# Patient Record
Sex: MALE | Race: WHITE | NOT HISPANIC OR LATINO | Employment: FULL TIME | URBAN - METROPOLITAN AREA
[De-identification: names, ages, dates, MRNs, and addresses within clinical notes are randomized per-mention and may not be internally consistent; named-entity substitution may affect disease eponyms.]

---

## 2019-03-13 ENCOUNTER — OFFICE VISIT (OUTPATIENT)
Dept: URGENT CARE | Facility: CLINIC | Age: 39
End: 2019-03-13
Payer: COMMERCIAL

## 2019-03-13 VITALS
DIASTOLIC BLOOD PRESSURE: 84 MMHG | RESPIRATION RATE: 20 BRPM | SYSTOLIC BLOOD PRESSURE: 120 MMHG | HEART RATE: 92 BPM | OXYGEN SATURATION: 99 % | TEMPERATURE: 98 F | WEIGHT: 183 LBS

## 2019-03-13 DIAGNOSIS — R20.2 PARESTHESIA: Primary | ICD-10-CM

## 2019-03-13 PROCEDURE — 99214 OFFICE O/P EST MOD 30 MIN: CPT | Mod: S$GLB,,, | Performed by: PHYSICIAN ASSISTANT

## 2019-03-13 PROCEDURE — 99999 PR PBB SHADOW E&M-NEW PATIENT-LVL IV: CPT | Mod: PBBFAC,,, | Performed by: PHYSICIAN ASSISTANT

## 2019-03-13 PROCEDURE — 99214 PR OFFICE/OUTPT VISIT, EST, LEVL IV, 30-39 MIN: ICD-10-PCS | Mod: S$GLB,,, | Performed by: PHYSICIAN ASSISTANT

## 2019-03-13 PROCEDURE — 99999 PR PBB SHADOW E&M-NEW PATIENT-LVL IV: ICD-10-PCS | Mod: PBBFAC,,, | Performed by: PHYSICIAN ASSISTANT

## 2019-03-13 RX ORDER — HYDROCODONE BITARTRATE AND ACETAMINOPHEN 5; 325 MG/1; MG/1
1 TABLET ORAL
COMMUNITY
End: 2019-03-18

## 2019-03-13 NOTE — LETTER
March 13, 2019      [facility]  [address] [place] [address]  Phone: [phone]  Fax: [phone]       Patient: Raoul Pavon   YOB: 1980  Date of Visit: 03/13/2019    To Whom It May Concern:    Marguerite Pavon  was at [facility] on 03/13/2019. He may return to work on 3/18/2019 with no restrictions. If you have any questions or concerns, or if I can be of further assistance, please do not hesitate to contact me.    Sincerely,    Keila León PA-C

## 2019-03-13 NOTE — PROGRESS NOTES
Subjective:      Patient ID: Raoul Pavon is a 38 y.o. male.    Chief Complaint: Tingling    Raoul is a 38 year male who presents to urgent care with intermittent numbness/tingling/pressure around his left eye/upper left face for the past month.  Denies visual disturbances, facial droop, facial or extremity weakness, chest pain, palpitations.  The pain is worse with brushing his teeth.  Rubbing his face will make it go away for a few seconds.        Review of Systems   Constitutional: Negative for chills and fever.   HENT: Negative for ear discharge, ear pain, facial swelling, sinus pressure and sinus pain.    Eyes: Negative for pain, redness and visual disturbance.   Respiratory: Negative for chest tightness, shortness of breath and wheezing.    Cardiovascular: Negative for chest pain and palpitations.   Gastrointestinal: Negative for abdominal pain, diarrhea, nausea and vomiting.   Skin: Negative for rash.   Neurological: Positive for numbness (left upper face intermittent numb/tingling ). Negative for dizziness, tremors, seizures, syncope, facial asymmetry, speech difficulty, weakness, light-headedness and headaches.       Objective:   /84   Pulse 92   Temp 98.2 °F (36.8 °C)   Resp 20   Wt 83 kg (182 lb 15.7 oz)   SpO2 99%   Physical Exam   Constitutional: He is oriented to person, place, and time. He appears well-developed and well-nourished. He is active.  Non-toxic appearance. He does not have a sickly appearance. He does not appear ill. No distress.   HENT:   Head: Normocephalic.   Right Ear: Hearing, tympanic membrane, external ear and ear canal normal. No drainage, swelling or tenderness. Tympanic membrane is not perforated, not erythematous and not bulging. No middle ear effusion. No decreased hearing is noted.   Left Ear: Hearing, tympanic membrane, external ear and ear canal normal. No drainage, swelling or tenderness. Tympanic membrane is not perforated, not erythematous and not  bulging.  No middle ear effusion. No decreased hearing is noted.   Nose: Nose normal. No mucosal edema or rhinorrhea. Right sinus exhibits no maxillary sinus tenderness and no frontal sinus tenderness. Left sinus exhibits no maxillary sinus tenderness and no frontal sinus tenderness.   Mouth/Throat: Uvula is midline, oropharynx is clear and moist and mucous membranes are normal.   Eyes: Conjunctivae and EOM are normal. Pupils are equal, round, and reactive to light. Right eye exhibits normal extraocular motion and no nystagmus. Left eye exhibits normal extraocular motion and no nystagmus.   Neck: Normal range of motion and full passive range of motion without pain. Neck supple.   Cardiovascular: Normal rate, regular rhythm and normal heart sounds.   Pulses:       Radial pulses are 2+ on the right side, and 2+ on the left side.   Pulmonary/Chest: Effort normal and breath sounds normal.   Musculoskeletal:        Right hand: Normal strength noted.        Left hand: Normal strength noted.   JOANN UE and LE strength 5/5   Neurological: He is alert and oriented to person, place, and time. He has normal strength. He is not disoriented. No cranial nerve deficit or sensory deficit. Coordination and gait normal.   Skin: Skin is warm and dry. He is not diaphoretic.   Vitals reviewed.    Assessment:      1. Paresthesia       Plan:   Paresthesia    Paresthesia    -  Intermittent tingling/numbness/pressure left upper face - etiology unclear    -  Raoul denies slurred speech, facial weakness or droop, visual changes, extremity numbness or weakness - reviewed symptoms of stroke and advised Raoul to go to ED if develop any stroke symptoms    -  Needs further eval/workup - recommended further eval with primary care - has outside primary care physician (Dr. Madden) - Raoul would like to establish a primary care physician with [facility] - appt made for next week     AVS provided and instructions reviewed with patient. Patient was  counseled on supportive care and instructed to return or contact primary care provider if condition does not improve or for any new or worsening symptoms.    Keila León PA-C   Physician Assistant   [facility], [place]

## 2019-03-13 NOTE — PATIENT INSTRUCTIONS
"  Paraesthesias  Paraesthesia is a burning or prickling sensation that is sometimes felt in the hands, arms, legs or feet. It can also occur in other parts of the body. It can also feel like tingling or numbness, skin crawling, or itching. The feeling is not comfortable, but it is not painful. (The "pins and needles" feeling that happens when a foot or hand "falls asleep" is a temporary paraesthesia.)  Paraesthesias that last or come and go may be caused by medical issues that need to be treated. These include stroke, a bulging disk pressing on a nerve, a trapped nerve, vitamin deficiencies, or even certain medicines.  Tests are often done. These tests may include blood tests, X-ray, CT (computerized tomography) scan, or a muscle test (electromyography). Depending on the cause, treatment may include physical therapy.  Home care  · Tell the healthcare provider about all medicines you take. This includes prescription and over-the-counter medicines, vitamins, and herbs. Ask if any of the medicines may be causing your problems. Do not make any changes to prescription medicines without talking to your healthcare provider first.  · You may be prescribed medicines to help relieve the tingling feeling or for pain. Take all medicines as directed.  · A numb hand or foot may be more prone to injury. To help protect it:  ¨ Always use oven mitts.  ¨ Test water with an unaffected hand or foot.  ¨ Use caution when trimming nails. File sharp areas.  ¨ Wear shoes that fit well to avoid pressure points, blisters, and ulcers.  ¨ Inspect your hands and feet carefully (including the soles of your feet and between your toes) at least once a week. If you see red areas, sores, or other problems, tell your healthcare provider.  Follow-up care  Follow up with your doctor or as advised by our staff. You may need further testing or evaluation.  When to seek medical advice  Call your healthcare provider right away if any of the following " occur:  · Numbness or weakness of the face, one arm, or one leg  · Slurred speech, confusion, trouble speaking, walking, or seeing  · Severe headache, fainting spell, dizziness, or seizure  · Chest, arm, neck, or upper back pain  · Loss of bladder or bowel control  · Open wound with redness, swelling, or pus  Date Last Reviewed: 9/25/2015  © 1169-5163 [employer]. [address] [place] [address], [place] [address]. All rights reserved. This information is not intended as a substitute for professional medical care. Always follow your healthcare professional's instructions.

## 2019-03-14 ENCOUNTER — TELEPHONE (OUTPATIENT)
Dept: INTERNAL MEDICINE | Facility: CLINIC | Age: 39
End: 2019-03-14

## 2019-03-14 NOTE — TELEPHONE ENCOUNTER
----- Message from Sammie Roach sent at 3/14/2019  7:41 AM CDT -----  patient would like scan ordered, still having tingling issue..[phone] (home)

## 2019-03-14 NOTE — TELEPHONE ENCOUNTER
Consulted with Dr. Dodge due to Keila León being out of clinic.     Spoke with Patient and he stated that his symptoms have not worsened or improved. They are still the same.   Pt wanted to know if we would order him a scan. Informed pt we cannot order scans in urgent care but according to the providers note he is to follow up with PCP next week. If his symptoms worsen or if he develops any slurred speech, facial weakness, drooping, visual changes, extremities numbness, or weakness to go to the ER Immediately. Pt stated understanding.

## 2019-03-18 ENCOUNTER — HOSPITAL ENCOUNTER (EMERGENCY)
Facility: HOSPITAL | Age: 39
Discharge: HOME | End: 2019-03-18
Attending: EMERGENCY MEDICINE
Payer: COMMERCIAL

## 2019-03-18 VITALS
WEIGHT: 185.31 LBS | OXYGEN SATURATION: 99 % | BODY MASS INDEX: 29.09 KG/M2 | RESPIRATION RATE: 18 BRPM | DIASTOLIC BLOOD PRESSURE: 75 MMHG | HEIGHT: 67 IN | TEMPERATURE: 98 F | SYSTOLIC BLOOD PRESSURE: 116 MMHG | HEART RATE: 60 BPM

## 2019-03-18 DIAGNOSIS — R20.0 NUMBNESS: ICD-10-CM

## 2019-03-18 LAB
ALBUMIN SERPL BCP-MCNC: 4.5 G/DL
ALP SERPL-CCNC: 48 U/L
ALT SERPL W/O P-5'-P-CCNC: 18 U/L
ANION GAP SERPL CALC-SCNC: 8 MMOL/L
AST SERPL-CCNC: 18 U/L
BASOPHILS # BLD AUTO: 0.06 K/UL
BASOPHILS NFR BLD: 0.7 %
BILIRUB SERPL-MCNC: 0.5 MG/DL
BUN SERPL-MCNC: 10 MG/DL
CALCIUM SERPL-MCNC: 9.8 MG/DL
CHLORIDE SERPL-SCNC: 107 MMOL/L
CO2 SERPL-SCNC: 26 MMOL/L
CREAT SERPL-MCNC: 1 MG/DL
DIFFERENTIAL METHOD: ABNORMAL
EOSINOPHIL # BLD AUTO: 0.3 K/UL
EOSINOPHIL NFR BLD: 3.9 %
ERYTHROCYTE [DISTWIDTH] IN BLOOD BY AUTOMATED COUNT: 13 %
EST. GFR  (AFRICAN AMERICAN): >60 ML/MIN/1.73 M^2
EST. GFR  (NON AFRICAN AMERICAN): >60 ML/MIN/1.73 M^2
GLUCOSE SERPL-MCNC: 88 MG/DL
HCT VFR BLD AUTO: 49.9 %
HGB BLD-MCNC: 17.1 G/DL
INR PPP: 0.9
LYMPHOCYTES # BLD AUTO: 2.3 K/UL
LYMPHOCYTES NFR BLD: 26.5 %
MCH RBC QN AUTO: 31.1 PG
MCHC RBC AUTO-ENTMCNC: 34.3 G/DL
MCV RBC AUTO: 91 FL
MONOCYTES # BLD AUTO: 0.3 K/UL
MONOCYTES NFR BLD: 3.9 %
NEUTROPHILS # BLD AUTO: 5.6 K/UL
NEUTROPHILS NFR BLD: 65 %
PLATELET # BLD AUTO: 228 K/UL
PMV BLD AUTO: 10.4 FL
POTASSIUM SERPL-SCNC: 4.6 MMOL/L
PROT SERPL-MCNC: 7.9 G/DL
PROTHROMBIN TIME: 10.3 SEC
RBC # BLD AUTO: 5.49 M/UL
SODIUM SERPL-SCNC: 141 MMOL/L
WBC # BLD AUTO: 8.68 K/UL

## 2019-03-18 PROCEDURE — 85610 PROTHROMBIN TIME: CPT

## 2019-03-18 PROCEDURE — 93005 ELECTROCARDIOGRAM TRACING: CPT

## 2019-03-18 PROCEDURE — 93010 EKG 12-LEAD: ICD-10-PCS | Mod: ,,, | Performed by: INTERNAL MEDICINE

## 2019-03-18 PROCEDURE — 93010 ELECTROCARDIOGRAM REPORT: CPT | Mod: ,,, | Performed by: INTERNAL MEDICINE

## 2019-03-18 PROCEDURE — 80053 COMPREHEN METABOLIC PANEL: CPT

## 2019-03-18 PROCEDURE — 99285 EMERGENCY DEPT VISIT HI MDM: CPT | Mod: 25

## 2019-03-18 PROCEDURE — 85025 COMPLETE CBC W/AUTO DIFF WBC: CPT

## 2019-03-18 RX ORDER — PREDNISONE 50 MG/1
50 TABLET ORAL DAILY
Qty: 5 TABLET | Refills: 0 | Status: SHIPPED | OUTPATIENT
Start: 2019-03-18 | End: 2019-03-23

## 2019-03-18 NOTE — ED PROVIDER NOTES
Encounter Date: 3/18/2019       History     Chief Complaint   Patient presents with    Facial Pain     c/o facial pain/numbness for the last month      38 year old male with complaint of numbness and tingling from left side of forehead to left maxilla region X one month.  No facial weakness. No pain.  No fever or chills.  No change in vision.  Pt evaluated at urgent care last week with no diagnosis.  Denies neck pain.  Denies facial pain.            Review of patient's allergies indicates:  No Known Allergies  History reviewed. No pertinent past medical history.  Past Surgical History:   Procedure Laterality Date    HERNIA REPAIR       History reviewed. No pertinent family history.  Social History     Tobacco Use    Smoking status: Current Every Day Smoker     Packs/day: 1.00   Substance Use Topics    Alcohol use: Yes     Frequency: Never    Drug use: No     Review of Systems   Constitutional: Negative for fever.   HENT: Negative for sore throat.    Respiratory: Negative for shortness of breath.    Cardiovascular: Negative for chest pain.   Gastrointestinal: Negative for nausea.   Genitourinary: Negative for dysuria.   Musculoskeletal: Negative for back pain.   Skin: Negative for rash.   Neurological: Positive for numbness. Negative for weakness.   Hematological: Does not bruise/bleed easily.       Physical Exam     Initial Vitals [03/18/19 0905]   BP Pulse Resp Temp SpO2   125/79 83 18 98 °F (36.7 °C) 100 %      MAP       --         Physical Exam    Nursing note and vitals reviewed.  Constitutional: He appears well-developed and well-nourished.   HENT:   Head: Normocephalic and atraumatic.   Eyes: Conjunctivae are normal. Pupils are equal, round, and reactive to light.   Neck: Normal range of motion. Neck supple.   Cardiovascular: Normal rate, regular rhythm, normal heart sounds and intact distal pulses.   Pulmonary/Chest: Breath sounds normal.   Abdominal: Soft. There is no rebound and no guarding.    Musculoskeletal: Normal range of motion.   Neurological: He is alert and oriented to person, place, and time. He has normal strength and normal reflexes. He displays normal reflexes. No cranial nerve deficit or sensory deficit.   Skin: Skin is warm and dry.   Psychiatric: He has a normal mood and affect. His behavior is normal. Thought content normal.         ED Course   Procedures  Labs Reviewed   CBC W/ AUTO DIFFERENTIAL - Abnormal; Notable for the following components:       Result Value    MCH 31.1 (*)     Mono% 3.9 (*)     All other components within normal limits   COMPREHENSIVE METABOLIC PANEL - Abnormal; Notable for the following components:    Alkaline Phosphatase 48 (*)     All other components within normal limits   PROTIME-INR     EKG Readings: (Independently Interpreted)   Other EKG Interpretations: EKG: NSR with rate of 67, no st or t wave abnormalities       Imaging Results          MRI Brain Without Contrast (Final result)  Result time 03/18/19 14:15:07    Final result by Lucian Gallego Jr., MD (03/18/19 14:15:07)                 Impression:      Findings are most concerning for demyelinating disease and multiple sclerosis.  Postcontrast MRI imaging may help to evaluate for active demyelination.      Electronically signed by: Lucian Gallego Jr., MD  Date:    03/18/2019  Time:    14:15             Narrative:    EXAMINATION:  MRI BRAIN WITHOUT CONTRAST    CLINICAL HISTORY:  Neuro deficit(s), subacute; trigeminal neuralgia/numbness.    TECHNIQUE:  Sagittal T1. Axial T1, T2, T2 FLAIR, DWI. Coronal T2 FLAIR    COMPARISON:  None    FINDINGS:  There is no restricted diffusion. There are multiple elliptical shaped foci of T2 FLAIR hyperintensity that radiate outward from the bodies of the lateral ventricles.  There are also foci of T2 FLAIR hyperintensity involving the subcortical white matter and subcortical U fibers within the left frontal, right frontal, and right parietal lobes.  Elliptical shaped focus  of T2 FLAIR hyperintensity within the left thalamus and left lentiform nucleus.  Rounded focus within the left brachium pontis.    The ventricles and sulci are normal in size and configuration. Midline structures are normal. There are preserved arterial flow-voids on T2 weighted imaging. The paranasal sinuses and mastoid air cells are clear.    No abnormal marrow signal is identified.                               CT Head Without Contrast (Final result)  Result time 03/18/19 10:59:16    Final result by Ben Gage Jr., MD (03/18/19 10:59:16)                 Impression:      No acute intracranial findings evident.    All CT scans at this facility are performed  using dose modulation techniques as appropriate to performed exam including the following:  automated exposure control; adjustment of mA and/or kV according to the patients size (this includes techniques or standardized protocols for targeted exams where dose is matched to indication/reason for exam: i.e. extremities or head);  iterative reconstruction technique.      Electronically signed by: Ben Gage MD  Date:    03/18/2019  Time:    10:59             Narrative:    EXAMINATION:  CT HEAD WITHOUT CONTRAST    CLINICAL HISTORY:  Dizziness;    TECHNIQUE:  Noncontrast axial images of the head were obtained.    COMPARISON:  No comparison studies are available.    FINDINGS:  Vague 1 cm zone of hypodensity in the left thalamus, could correlate with a subacute ischemic infarct, image 12 series 2.    Ventricular system is normal.  No hydrocephalus.  No midline shift.  No abnormal density to indicate acute major vascular distribution ischemic infarction or hemorrhage.  No mass effect.  No extra-axial fluid collections.    Visualized paranasal sinuses and mastoid air cells appear clear.    No calvarial fracture.                                  Labs Reviewed   CBC W/ AUTO DIFFERENTIAL - Abnormal; Notable for the following components:       Result Value     MCH 31.1 (*)     Mono% 3.9 (*)     All other components within normal limits   COMPREHENSIVE METABOLIC PANEL - Abnormal; Notable for the following components:    Alkaline Phosphatase 48 (*)     All other components within normal limits   PROTIME-INR       Medical Decision Makin:35 PM  Discussed results with pt, pt has no motor deficits, pt reports that he has a neurologist that he will follow up with for further evaluation and treatment     Imaging Results          MRI Brain Without Contrast (Final result)  Result time 19 14:15:07    Final result by Lucian Gallego Jr., MD (19 14:15:07)                 Impression:      Findings are most concerning for demyelinating disease and multiple sclerosis.  Postcontrast MRI imaging may help to evaluate for active demyelination.      Electronically signed by: Lucian Gallego Jr., MD  Date:    2019  Time:    14:15             Narrative:    EXAMINATION:  MRI BRAIN WITHOUT CONTRAST    CLINICAL HISTORY:  Neuro deficit(s), subacute; trigeminal neuralgia/numbness.    TECHNIQUE:  Sagittal T1. Axial T1, T2, T2 FLAIR, DWI. Coronal T2 FLAIR    COMPARISON:  None    FINDINGS:  There is no restricted diffusion. There are multiple elliptical shaped foci of T2 FLAIR hyperintensity that radiate outward from the bodies of the lateral ventricles.  There are also foci of T2 FLAIR hyperintensity involving the subcortical white matter and subcortical U fibers within the left frontal, right frontal, and right parietal lobes.  Elliptical shaped focus of T2 FLAIR hyperintensity within the left thalamus and left lentiform nucleus.  Rounded focus within the left brachium pontis.    The ventricles and sulci are normal in size and configuration. Midline structures are normal. There are preserved arterial flow-voids on T2 weighted imaging. The paranasal sinuses and mastoid air cells are clear.    No abnormal marrow signal is identified.                               CT Head Without  Contrast (Final result)  Result time 03/18/19 10:59:16    Final result by Ben Gage Jr., MD (03/18/19 10:59:16)                 Impression:      No acute intracranial findings evident.    All CT scans at this facility are performed  using dose modulation techniques as appropriate to performed exam including the following:  automated exposure control; adjustment of mA and/or kV according to the patients size (this includes techniques or standardized protocols for targeted exams where dose is matched to indication/reason for exam: i.e. extremities or head);  iterative reconstruction technique.      Electronically signed by: Ben Gage MD  Date:    03/18/2019  Time:    10:59             Narrative:    EXAMINATION:  CT HEAD WITHOUT CONTRAST    CLINICAL HISTORY:  Dizziness;    TECHNIQUE:  Noncontrast axial images of the head were obtained.    COMPARISON:  No comparison studies are available.    FINDINGS:  Vague 1 cm zone of hypodensity in the left thalamus, could correlate with a subacute ischemic infarct, image 12 series 2.    Ventricular system is normal.  No hydrocephalus.  No midline shift.  No abnormal density to indicate acute major vascular distribution ischemic infarction or hemorrhage.  No mass effect.  No extra-axial fluid collections.    Visualized paranasal sinuses and mastoid air cells appear clear.    No calvarial fracture.                                                  Clinical Impression:       ICD-10-CM ICD-9-CM   1. Numbness R20.0 782.0                                Nayan Madrid NP  03/18/19 1437       Nayan Madrid NP  03/18/19 1437

## 2021-11-20 ENCOUNTER — HOSPITAL ENCOUNTER (OUTPATIENT)
Dept: RADIOLOGY | Facility: CLINIC | Age: 41
Discharge: HOME | End: 2021-11-20
Attending: EMERGENCY MEDICINE
Payer: OTHER MISCELLANEOUS

## 2021-11-20 ENCOUNTER — OFFICE VISIT (OUTPATIENT)
Dept: URGENT CARE | Facility: CLINIC | Age: 41
End: 2021-11-20
Payer: OTHER MISCELLANEOUS

## 2021-11-20 VITALS
DIASTOLIC BLOOD PRESSURE: 62 MMHG | BODY MASS INDEX: 32.96 KG/M2 | HEIGHT: 67 IN | SYSTOLIC BLOOD PRESSURE: 148 MMHG | WEIGHT: 210 LBS | TEMPERATURE: 98 F | OXYGEN SATURATION: 98 % | HEART RATE: 84 BPM | RESPIRATION RATE: 18 BRPM

## 2021-11-20 DIAGNOSIS — S99.922A INJURY OF LEFT FOOT, INITIAL ENCOUNTER: Primary | ICD-10-CM

## 2021-11-20 DIAGNOSIS — S99.922A INJURY OF LEFT FOOT, INITIAL ENCOUNTER: ICD-10-CM

## 2021-11-20 PROCEDURE — 99203 PR OFFICE/OUTPT VISIT, NEW, LEVL III, 30-44 MIN: ICD-10-PCS | Mod: S$GLB,,, | Performed by: EMERGENCY MEDICINE

## 2021-11-20 PROCEDURE — 99203 OFFICE O/P NEW LOW 30 MIN: CPT | Mod: S$GLB,,, | Performed by: EMERGENCY MEDICINE

## 2021-11-20 PROCEDURE — 73630 XR FOOT COMPLETE 3 VIEW LEFT: ICD-10-PCS | Mod: LT,S$GLB,, | Performed by: RADIOLOGY

## 2021-11-20 PROCEDURE — 73630 X-RAY EXAM OF FOOT: CPT | Mod: LT,S$GLB,, | Performed by: RADIOLOGY

## 2021-11-20 RX ORDER — DEXTROAMPHETAMINE SACCHARATE, AMPHETAMINE ASPARTATE, DEXTROAMPHETAMINE SULFATE AND AMPHETAMINE SULFATE 2.5; 2.5; 2.5; 2.5 MG/1; MG/1; MG/1; MG/1
TABLET ORAL
COMMUNITY

## 2021-11-23 ENCOUNTER — TELEPHONE (OUTPATIENT)
Dept: URGENT CARE | Facility: CLINIC | Age: 41
End: 2021-11-23
Payer: COMMERCIAL